# Patient Record
Sex: MALE | Race: BLACK OR AFRICAN AMERICAN | NOT HISPANIC OR LATINO | Employment: OTHER | URBAN - METROPOLITAN AREA
[De-identification: names, ages, dates, MRNs, and addresses within clinical notes are randomized per-mention and may not be internally consistent; named-entity substitution may affect disease eponyms.]

---

## 2020-01-12 ENCOUNTER — HOSPITAL ENCOUNTER (EMERGENCY)
Facility: HOSPITAL | Age: 75
Discharge: HOME | End: 2020-01-12
Attending: EMERGENCY MEDICINE
Payer: COMMERCIAL

## 2020-01-12 VITALS
WEIGHT: 175 LBS | HEART RATE: 62 BPM | OXYGEN SATURATION: 98 % | SYSTOLIC BLOOD PRESSURE: 136 MMHG | DIASTOLIC BLOOD PRESSURE: 74 MMHG | TEMPERATURE: 99 F | BODY MASS INDEX: 24.5 KG/M2 | HEIGHT: 71 IN | RESPIRATION RATE: 14 BRPM

## 2020-01-12 DIAGNOSIS — N20.0 KIDNEY STONE: Primary | ICD-10-CM

## 2020-01-12 DIAGNOSIS — R10.9 FLANK PAIN: ICD-10-CM

## 2020-01-12 LAB
ALBUMIN SERPL BCP-MCNC: 3.9 G/DL (ref 3.5–5.2)
ALP SERPL-CCNC: 57 U/L (ref 55–135)
ALT SERPL W/O P-5'-P-CCNC: 14 U/L (ref 10–44)
ANION GAP SERPL CALC-SCNC: 11 MMOL/L (ref 8–16)
AST SERPL-CCNC: 22 U/L (ref 10–40)
BASOPHILS # BLD AUTO: 0.05 K/UL (ref 0–0.2)
BASOPHILS NFR BLD: 0.9 % (ref 0–1.9)
BILIRUB SERPL-MCNC: 0.3 MG/DL (ref 0.1–1)
BILIRUB UR QL STRIP: NEGATIVE
BUN SERPL-MCNC: 23 MG/DL (ref 8–23)
CALCIUM SERPL-MCNC: 9.3 MG/DL (ref 8.7–10.5)
CHLORIDE SERPL-SCNC: 106 MMOL/L (ref 95–110)
CLARITY UR: CLEAR
CO2 SERPL-SCNC: 24 MMOL/L (ref 23–29)
COLOR UR: YELLOW
CREAT SERPL-MCNC: 1.4 MG/DL (ref 0.5–1.4)
DIFFERENTIAL METHOD: ABNORMAL
EOSINOPHIL # BLD AUTO: 0.3 K/UL (ref 0–0.5)
EOSINOPHIL NFR BLD: 5.1 % (ref 0–8)
ERYTHROCYTE [DISTWIDTH] IN BLOOD BY AUTOMATED COUNT: 13.9 % (ref 11.5–14.5)
EST. GFR  (AFRICAN AMERICAN): 57 ML/MIN/1.73 M^2
EST. GFR  (NON AFRICAN AMERICAN): 49 ML/MIN/1.73 M^2
GLUCOSE SERPL-MCNC: 117 MG/DL (ref 70–110)
GLUCOSE UR QL STRIP: NEGATIVE
HCT VFR BLD AUTO: 40.8 % (ref 40–54)
HGB BLD-MCNC: 12.8 G/DL (ref 14–18)
HGB UR QL STRIP: NEGATIVE
KETONES UR QL STRIP: NEGATIVE
LEUKOCYTE ESTERASE UR QL STRIP: NEGATIVE
LIPASE SERPL-CCNC: 22 U/L (ref 4–60)
LYMPHOCYTES # BLD AUTO: 1.6 K/UL (ref 1–4.8)
LYMPHOCYTES NFR BLD: 30 % (ref 18–48)
MCH RBC QN AUTO: 27.2 PG (ref 27–31)
MCHC RBC AUTO-ENTMCNC: 31.4 G/DL (ref 32–36)
MCV RBC AUTO: 87 FL (ref 82–98)
MONOCYTES # BLD AUTO: 0.6 K/UL (ref 0.3–1)
MONOCYTES NFR BLD: 11.7 % (ref 4–15)
NEUTROPHILS # BLD AUTO: 2.8 K/UL (ref 1.8–7.7)
NEUTROPHILS NFR BLD: 52.3 % (ref 38–73)
NITRITE UR QL STRIP: NEGATIVE
PH UR STRIP: 7 [PH] (ref 5–8)
PLATELET # BLD AUTO: 196 K/UL (ref 150–350)
PMV BLD AUTO: 10.9 FL (ref 9.2–12.9)
POTASSIUM SERPL-SCNC: 4.2 MMOL/L (ref 3.5–5.1)
PROT SERPL-MCNC: 7.4 G/DL (ref 6–8.4)
PROT UR QL STRIP: NEGATIVE
RBC # BLD AUTO: 4.7 M/UL (ref 4.6–6.2)
SODIUM SERPL-SCNC: 141 MMOL/L (ref 136–145)
SP GR UR STRIP: 1.02 (ref 1–1.03)
TROPONIN I SERPL DL<=0.01 NG/ML-MCNC: 0.04 NG/ML (ref 0–0.03)
TROPONIN I SERPL DL<=0.01 NG/ML-MCNC: 0.06 NG/ML (ref 0–0.03)
URN SPEC COLLECT METH UR: NORMAL
UROBILINOGEN UR STRIP-ACNC: NEGATIVE EU/DL
WBC # BLD AUTO: 5.3 K/UL (ref 3.9–12.7)

## 2020-01-12 PROCEDURE — 99284 EMERGENCY DEPT VISIT MOD MDM: CPT | Mod: 25

## 2020-01-12 PROCEDURE — 96375 TX/PRO/DX INJ NEW DRUG ADDON: CPT

## 2020-01-12 PROCEDURE — 84484 ASSAY OF TROPONIN QUANT: CPT

## 2020-01-12 PROCEDURE — 96361 HYDRATE IV INFUSION ADD-ON: CPT

## 2020-01-12 PROCEDURE — 85025 COMPLETE CBC W/AUTO DIFF WBC: CPT

## 2020-01-12 PROCEDURE — 96365 THER/PROPH/DIAG IV INF INIT: CPT

## 2020-01-12 PROCEDURE — 81003 URINALYSIS AUTO W/O SCOPE: CPT

## 2020-01-12 PROCEDURE — 83690 ASSAY OF LIPASE: CPT

## 2020-01-12 PROCEDURE — 25000003 PHARM REV CODE 250: Performed by: EMERGENCY MEDICINE

## 2020-01-12 PROCEDURE — 93010 ELECTROCARDIOGRAM REPORT: CPT | Mod: ,,, | Performed by: INTERNAL MEDICINE

## 2020-01-12 PROCEDURE — 63600175 PHARM REV CODE 636 W HCPCS: Performed by: EMERGENCY MEDICINE

## 2020-01-12 PROCEDURE — 93005 ELECTROCARDIOGRAM TRACING: CPT

## 2020-01-12 PROCEDURE — 80053 COMPREHEN METABOLIC PANEL: CPT

## 2020-01-12 PROCEDURE — 93010 EKG 12-LEAD: ICD-10-PCS | Mod: ,,, | Performed by: INTERNAL MEDICINE

## 2020-01-12 RX ORDER — ONDANSETRON 4 MG/1
4 TABLET, ORALLY DISINTEGRATING ORAL EVERY 6 HOURS PRN
Qty: 15 TABLET | Refills: 0 | Status: SHIPPED | OUTPATIENT
Start: 2020-01-12

## 2020-01-12 RX ORDER — MORPHINE SULFATE 2 MG/ML
3 INJECTION, SOLUTION INTRAMUSCULAR; INTRAVENOUS
Status: DISCONTINUED | OUTPATIENT
Start: 2020-01-12 | End: 2020-01-12

## 2020-01-12 RX ORDER — ONDANSETRON 2 MG/ML
4 INJECTION INTRAMUSCULAR; INTRAVENOUS
Status: COMPLETED | OUTPATIENT
Start: 2020-01-12 | End: 2020-01-12

## 2020-01-12 RX ORDER — MORPHINE SULFATE 2 MG/ML
2 INJECTION, SOLUTION INTRAMUSCULAR; INTRAVENOUS
Status: COMPLETED | OUTPATIENT
Start: 2020-01-12 | End: 2020-01-12

## 2020-01-12 RX ORDER — TAMSULOSIN HYDROCHLORIDE 0.4 MG/1
0.4 CAPSULE ORAL DAILY
Qty: 10 CAPSULE | Refills: 0 | Status: SHIPPED | OUTPATIENT
Start: 2020-01-12

## 2020-01-12 RX ORDER — KETOROLAC TROMETHAMINE 30 MG/ML
15 INJECTION, SOLUTION INTRAMUSCULAR; INTRAVENOUS
Status: COMPLETED | OUTPATIENT
Start: 2020-01-12 | End: 2020-01-12

## 2020-01-12 RX ORDER — KETOROLAC TROMETHAMINE 10 MG/1
10 TABLET, FILM COATED ORAL EVERY 6 HOURS PRN
Qty: 15 TABLET | Refills: 0 | Status: SHIPPED | OUTPATIENT
Start: 2020-01-12

## 2020-01-12 RX ORDER — MORPHINE SULFATE 4 MG/ML
4 INJECTION, SOLUTION INTRAMUSCULAR; INTRAVENOUS
Status: COMPLETED | OUTPATIENT
Start: 2020-01-12 | End: 2020-01-12

## 2020-01-12 RX ORDER — OXYCODONE AND ACETAMINOPHEN 10; 325 MG/1; MG/1
1 TABLET ORAL EVERY 6 HOURS PRN
Qty: 12 TABLET | Refills: 0 | Status: SHIPPED | OUTPATIENT
Start: 2020-01-12

## 2020-01-12 RX ADMIN — ONDANSETRON 4 MG: 2 INJECTION INTRAMUSCULAR; INTRAVENOUS at 03:01

## 2020-01-12 RX ADMIN — MORPHINE SULFATE 2 MG: 2 INJECTION, SOLUTION INTRAMUSCULAR; INTRAVENOUS at 03:01

## 2020-01-12 RX ADMIN — MORPHINE SULFATE 4 MG: 4 INJECTION INTRAVENOUS at 04:01

## 2020-01-12 RX ADMIN — KETOROLAC TROMETHAMINE 15 MG: 30 INJECTION, SOLUTION INTRAMUSCULAR at 04:01

## 2020-01-12 RX ADMIN — SODIUM CHLORIDE 1000 ML: 0.9 INJECTION, SOLUTION INTRAVENOUS at 03:01

## 2020-01-12 RX ADMIN — PROMETHAZINE HYDROCHLORIDE 12.5 MG: 25 INJECTION INTRAMUSCULAR; INTRAVENOUS at 04:01

## 2020-01-12 NOTE — ED NOTES
"Patient presents to ED with L sided flank pain and dysuria that started today; patient admits to having a hx of kidney stones. Patient describes the pain as sharp accompanied by "waves" of nausea. Patient states he had a small episode of CP at home when he awoke from his sleep but that is now resolved.   "

## 2020-01-12 NOTE — ED PROVIDER NOTES
Encounter Date: 1/12/2020    SCRIBE #1 NOTE: I, Bee Levy, am scribing for, and in the presence of,  Dr. Barnes. I have scribed the entire note.       History     Chief Complaint   Patient presents with    Flank Pain     To ER with c/o left flank pain, burning with urination starting yesterday.     Kirk Sal is a 74 y.o. Male with PMHx of MI (x9 years ago/2011) who presents to the ED complaining of intermittent worsening left sided flank and stomach pain that began yesterday. Pt also presents with dysuria, CP, and SOB for x1 day. Pt states the pain woke him up out of his sleep tonight. Pt confirms having this pain previously and being diagnosed with a kidney stone. Pt took pain medication with no relief.    The history is provided by the patient.     Review of patient's allergies indicates:  No Known Allergies  Past Medical History:   Diagnosis Date    Hypertension      No past surgical history on file.  No family history on file.  Social History     Tobacco Use    Smoking status: Never Smoker   Substance Use Topics    Alcohol use: Yes     Comment: occasionally    Drug use: Not on file     Review of Systems   Constitutional: Negative for fever.   HENT: Negative for sore throat.    Respiratory: Positive for shortness of breath.    Cardiovascular: Positive for chest pain.   Gastrointestinal: Positive for abdominal distention. Negative for nausea.   Genitourinary: Positive for dysuria and flank pain.   Musculoskeletal: Negative for back pain.   Skin: Negative for rash.   Neurological: Negative for weakness.   Hematological: Does not bruise/bleed easily.   Psychiatric/Behavioral: Positive for sleep disturbance.   All other systems reviewed and are negative.      Physical Exam     Initial Vitals [01/12/20 0303]   BP Pulse Resp Temp SpO2   (!) 187/86 70 18 98.7 °F (37.1 °C) 99 %      MAP       --         Physical Exam    Nursing note and vitals reviewed.  Constitutional: He appears well-developed and  well-nourished.   HENT:   Head: Normocephalic and atraumatic.   Eyes: Conjunctivae are normal.   Neck: Normal range of motion. Neck supple.   Cardiovascular: Normal rate.   Pulmonary/Chest: No respiratory distress.   Musculoskeletal: Normal range of motion.   Left sided CVA tenderness.   Neurological: He is alert and oriented to person, place, and time.   Skin: Skin is warm and dry.         ED Course   Procedures  Labs Reviewed   CBC W/ AUTO DIFFERENTIAL - Abnormal; Notable for the following components:       Result Value    Hemoglobin 12.8 (*)     Mean Corpuscular Hemoglobin Conc 31.4 (*)     All other components within normal limits   COMPREHENSIVE METABOLIC PANEL - Abnormal; Notable for the following components:    Glucose 117 (*)     eGFR if  57 (*)     eGFR if non  49 (*)     All other components within normal limits   TROPONIN I - Abnormal; Notable for the following components:    Troponin I 0.059 (*)     All other components within normal limits   TROPONIN I - Abnormal; Notable for the following components:    Troponin I 0.041 (*)     All other components within normal limits   URINALYSIS, REFLEX TO URINE CULTURE    Narrative:     Preferred Collection Type->Urine, Clean Catch   LIPASE     EKG Readings: (Independently Interpreted)   Normal sinus rhythm, normal EKG, no acute ST elevations       Imaging Results          CT Renal Stone Study ABD Pelvis WO (Final result)  Result time 01/12/20 04:51:11    Final result by David Elizabeth MD (01/12/20 04:51:11)                 Impression:      There is a calculus along the proximal left ureter with mild left hydronephrosis and mild-to-moderate left perinephric stranding.    [place] prostatic haziness, correlation for prostatitis is needed.    Pulmonary nodule at the right middle lobe measuring approximately 4.5 mm.  For a solid nodule <6 mm, Fleischner Society 2017 guidelines recommend no routine follow up for a low risk patient, or  follow-up with non-contrast chest CT at 12 months in a high risk patient.    Mild pleural thickening and nodularity noted.    Mild fluid-filled appearance of the tip of the appendix, there is no periappendiceal inflammatory change, the appearance is nonspecific, there is mild prominence the appendix in general without wall thickening or inflammatory change and this may relate to variant anatomy however close clinical correlation is needed if there is right lower quadrant or appendiceal symptomatology or developing symptomatology additional follow-up is recommended.      Electronically signed by: David Elizabeth  Date:    01/12/2020  Time:    04:51             Narrative:    EXAMINATION:  CT RENAL STONE STUDY ABD PELVIS WO    CLINICAL HISTORY:  Flank pain, stone disease suspected;left cva tenderness;    TECHNIQUE:  Low dose axial images, sagittal and coronal reformations were obtained from the lung bases to the pubic symphysis.  Contrast was not administered.    COMPARISON:  None    FINDINGS:  As best seen on axial image 72 along the proximal left ureter there is a calculus measuring approximately 4.6 x 5.7 mm on axial imaging, measuring approximately 7.3 mm in the craniocaudal dimension on coronal reconstruction imaging.  There is associated mild hydronephrosis with mild to moderate perinephric stranding.  On the right there is no evidence for ureteral calculus or obstructive uropathy.  The urinary bladder appears unremarkable for degree of distention.    The lung bases demonstrate mild motion artifact and atelectatic change, there is a small calcified granuloma peripherally at the left lung base.  There is mild areas of pleural thickening noted.  Some of these are somewhat small and focal and nodular.  There is a pulmonary nodule of the right middle lobe noted axial image 11 measuring approximately 4.5 mm.    The stomach demonstrates nonspecific mild-to-moderate distention with ingested material, fluid and air.  When  accounting for limitations of the examination there is no evidence for acute process of the liver, gallbladder, pancreas, spleen or adrenal glands.  The abdominal aorta demonstrates atherosclerotic change appears normal in caliber otherwise not well evaluated on this exam.  The prostate appears somewhat prominent measuring approximately 4.3 by 5.4 cm on axial imaging, there is mild periprosthetic haziness, correlation for prostatitis is needed.    There is a small fat density supraumbilical midline anterior abdominal wall hernia without bowel involvement.  There is no evidence for small bowel obstructive process.  Mild prominence of the appendix noted without inflammatory appearance this may relate to variant anatomical configuration clinical correlation is needed.  There does appear to be mild fluid-filled component at the tip of the appendix and therefore if there is right lower quadrant symptomatology additional follow-up may be needed.  There is no evidence for inflammatory or obstructive process of the colon.  There is no evidence for free intraperitoneal air.  The osseous structures demonstrate chronic change.                                 Medical Decision Making:   Initial Assessment:   74-year-old year old male, reports a history of hypertension, presents the ER for evaluation of left flank pain.  Onset 1 day, progressively worsening with nausea pain and dysuria.  Left CVA tenderness noted on exam.  Reports feels like similar kidney stones he had few years ago.  Appears uncomfortable due to pain, but not septic appearing.  Differential includes UTI, cystitis, pyelonephritis, kidney stone, infected kidney stone, dissection, versus other cause.  Will obtain blood work CT imaging pain control will reassess.  Clinical Tests:   Radiological Study: Ordered and Reviewed  Medical Tests: Ordered and Reviewed            Scribe Attestation:   Scribe #1: I performed the above scribed service and the documentation  accurately describes the services I performed. I attest to the accuracy of the note.            ED Course as of Jan 13 0212   Sun Jan 12, 2020   0432 Resting in bed no acute distress, creatinine within normal limits, will give Toradol.  CT reviewed by myself revealed left sided kidney stone, questionable stranding, will await official read and will reassess.  First set troponin noted to be slightly elevated, will trend.    [SE]   0450 Patient has persistent pain and nausea despite multiple rounds of medication.  Will likely plan admission for intractable nausea and abdominal pain.  Awaiting official CT scan results in urine.    [SE]   0535 Patient will be signed out to Dr. Huang at 6:00 a.m. to follow blood work to reassess pain and nausea    [SE]   0752 Assumed care from Dr. Barnes at shift change, awaiting remainder of lab work. UA shows no signs of infection and GFR is 57. Patient has no fever and normal white cell count. He is now pain free. CT findings and lab work discussed with Dr. Freed, who agrees the patient may be discharged home. I will write prescriptions for Flomax, Zofran, Toradol and Percocet. He will follow up with Urologist at [facility]. The patient may also return to this ED for any worsening symptoms.     [SP]      ED Course User Index  [SE] Melissa Barnes MD  [SP] Missy Fischer                Clinical Impression:     1. Kidney stone    2. Flank pain                                Melissa Barnes MD  01/13/20 0212

## 2020-01-12 NOTE — ED NOTES
Pt laying in bed, sleeping, resp are even and unlabored,vss,  wife and bedside aware of plan of care to await all results and dispo from MD